# Patient Record
Sex: FEMALE | Race: WHITE | URBAN - METROPOLITAN AREA
[De-identification: names, ages, dates, MRNs, and addresses within clinical notes are randomized per-mention and may not be internally consistent; named-entity substitution may affect disease eponyms.]

---

## 2017-10-01 ENCOUNTER — TRANSFERRED RECORDS (OUTPATIENT)
Dept: HEALTH INFORMATION MANAGEMENT | Facility: CLINIC | Age: 32
End: 2017-10-01

## 2017-10-01 LAB — PAP SMEAR - HIM PATIENT REPORTED: NEGATIVE

## 2018-03-10 ENCOUNTER — OFFICE VISIT (OUTPATIENT)
Dept: FAMILY MEDICINE | Facility: CLINIC | Age: 33
End: 2018-03-10
Payer: COMMERCIAL

## 2018-03-10 VITALS
HEART RATE: 78 BPM | OXYGEN SATURATION: 97 % | HEIGHT: 67 IN | WEIGHT: 154 LBS | TEMPERATURE: 98.6 F | RESPIRATION RATE: 14 BRPM | DIASTOLIC BLOOD PRESSURE: 68 MMHG | BODY MASS INDEX: 24.17 KG/M2 | SYSTOLIC BLOOD PRESSURE: 110 MMHG

## 2018-03-10 DIAGNOSIS — K92.1 BLOOD IN STOOL: Primary | ICD-10-CM

## 2018-03-10 DIAGNOSIS — Z23 NEED FOR PROPHYLACTIC VACCINATION WITH TETANUS-DIPHTHERIA (TD): ICD-10-CM

## 2018-03-10 DIAGNOSIS — Z12.4 SCREENING FOR MALIGNANT NEOPLASM OF CERVIX: ICD-10-CM

## 2018-03-10 LAB — HGB BLD-MCNC: 12.8 G/DL (ref 11.7–15.7)

## 2018-03-10 PROCEDURE — 85018 HEMOGLOBIN: CPT | Performed by: FAMILY MEDICINE

## 2018-03-10 PROCEDURE — 99204 OFFICE O/P NEW MOD 45 MIN: CPT | Performed by: FAMILY MEDICINE

## 2018-03-10 PROCEDURE — 36415 COLL VENOUS BLD VENIPUNCTURE: CPT | Performed by: FAMILY MEDICINE

## 2018-03-10 RX ORDER — NORGESTIMATE AND ETHINYL ESTRADIOL 7DAYSX3 28
1 KIT ORAL DAILY
COMMUNITY

## 2018-03-10 NOTE — MR AVS SNAPSHOT
After Visit Summary   3/10/2018    Sushma Palmer    MRN: [id number]           Patient Information     Date Of Birth          1985        Visit Information        Provider Department      3/10/2018 11:00 AM Jaqueline Lee MD [facility]        Today's Diagnoses     Blood in stool    -  1    Screening for malignant neoplasm of cervix        Need for prophylactic vaccination with tetanus-diphtheria (TD)          Care Instructions      -We need to figure out what's going on with this for you!  -It could be an infection - (E Coli, Salmonella, or others) so we'll check for those today.  -It could hemorrhoids but I don't think so.  Usually this is a small amount of bleeding and only on toilet paper, with a painful bump at your anus.  -I'm concerned that this could be ulcerative colitis or crohn's disease.  You need to have a colonoscopy to rule this out - please call to schedule ASAP (I'd like this to be done this week, you will likely need to take a day off of work for this).  -We're going to check a hgb today; if this is seriously low you'll have to go the ER.        When You Have Gastrointestinal (GI) Bleeding    Blood in your vomit or stool can be a sign of gastrointestinal (GI) bleeding. GI bleeding can be scary. But the cause may not be serious. You should always see a doctor if GI bleeding occurs.  The GI tract  The GI tract is the path through which food travels in the body. Food passes from the mouth down the esophagus (the tube from the mouth to the stomach). Food begins to break down in the stomach. It then moves through the duodenum, the first part of the small intestine. Nutrients are absorbed as food travels through the small intestine. What is left passes into the colon (large intestine) as waste. The colon removes water from the waste. Waste continues from the colon to the rectum (where stool is stored). Waste then leaves the body through the  anus.  Causes of GI bleeding  GI bleeding can be caused by many different problems. Some of the more common causes include:    Swollen veins in the anus (hemorrhoids)    Swollen veins in the esophagus (varices)    Sore on the lining of the GI tract (ulcer)    Cuts or scrapes in the mouth or throat    Infection caused by germs such as bacteria or parasites    Food allergies, such as milk allergy in young children    Medicines    Inflammation of the GI tract (gastritis or esophagitis)    Colitis (Crohn's disease or ulcerative colitis)    Cancer (tumors or polyps)    Abnormal pouches in the colon (diverticula)    Tears in the esophagus or anus    Nosebleed    Abnormal blood vessels in the GI tract (angiodysplasia)  Diagnosing the cause of blood in stool  If blood is coming out in your stool, you may have a lower GI tract problem or a very fast upper GI tract bleed. Bleeding from the GI tract can be bright red. Or it may look dark and tarry. Tests may also find blood in your stool that can t be seen with the eye (occult blood). To find out the cause, tests that may be ordered include:    Blood tests. A blood sample is taken and sent to a lab for exam.    Hemoccult test. Checks a stool sample for blood.    Stool culture. Checks a stool sample for bacteria or parasites.    X-ray, ultrasound, or CT scan. Imaging tests that take pictures of the digestive tract.    Colonoscopy or sigmoidoscopy. This test uses a flexible tube with a tiny camera. The tube is inserted through your anus into your rectum to see the inside of your colon. Your provider can also take a tiny tissue sample (biopsy) and treat a bleeding source  Diagnosing the cause of blood in vomit  If you are vomiting blood or something that looks like coffee grounds, you may have an upper GI tract problem. To find the cause, tests that may be done include:    Upper Endoscopy. A flexible tube with a tiny camera is inserted through your mouth and throat to see inside  your upper GI tract. This lets your provider take a tiny tissue sample (biopsy) and treat a bleeding source.    Nasogastric lavage. This can tell if you have upper GI or lower GI bleeding.    X-ray, ultrasound, or CT scan. Imaging tests that take pictures of your digestive tract.    Upper GI series. X-rays of the upper part of your GI tract taken from inside your body.    Enteroscopy. This sends a flexible tube or a small, swallowed capsule camera into your small intestine.  When to call your healthcare provider  Call your healthcare provider right away if you have any of the following:    Bleeding from your mouth or anus that can't be stopped    Fever of 100.4 F (38.0 ) or higher    Bleeding along with feeling lightheaded or dizzy    Signs of fluid loss (dehydration). These include a dry, sticky mouth, decreased urine output; and very dark urine.    Belly (abdominal) pain   Date Last Reviewed: 7/1/2016 2000-2017 [employer]. [address] [place] [address]. All rights reserved. This information is not intended as a substitute for professional medical care. Always follow your healthcare professional's instructions.                Follow-ups after your visit        Additional Services     GASTROENTEROLOGY ADULT REF PROCEDURE ONLY [facility] [phone] (Dr. Blackburn - or whoever she can get in with first ASAP)       Last Lab Result: No results found for: CR  Body mass index is 24.12 kg/(m^2).     Needed:  No  Language:  English    Patient will be contacted to schedule procedure.     Please be aware that coverage of these services is subject to the terms and limitations of your health insurance plan.  Call member services at your health plan with any benefit or coverage questions.  Any procedures must be performed at a [facility] facility OR coordinated by your clinic's referral office.    Please bring the following with you to your appointment:    (1) Any X-Rays, CTs or MRIs  "which have been performed.  Contact the facility where they were done to arrange for  prior to your scheduled appointment.    (2) List of current medications   (3) This referral request   (4) Any documents/labs given to you for this referral                  Follow-up notes from your care team     Return in about 4 weeks (around 4/7/2018) for Follow up of results.      Future tests that were ordered for you today     Open Future Orders        Priority Expected Expires Ordered    Enteric Bacteria and Virus Panel by JAGRUTI Stool Routine  3/10/2019 3/10/2018            Who to contact     If you have questions or need follow up information about today's clinic visit or your schedule please contact [facility] directly at [phone].  Normal or non-critical lab and imaging results will be communicated to you by [employer] Directhart, letter or phone within 4 business days after the clinic has received the results. If you do not hear from us within 7 days, please contact the clinic through [employer] Direct[employer] or phone. If you have a critical or abnormal lab result, we will notify you by phone as soon as possible.  Submit refill requests through [employer] or call your pharmacy and they will forward the refill request to us. Please allow 3 business days for your refill to be completed.          Additional Information About Your Visit        Visitar Information     [employer] lets you send messages to your doctor, view your test results, renew your prescriptions, schedule appointments and more. To sign up, go to [email or web address] . Click on \"Log in\" on the left side of the screen, which will take you to the Welcome page. Then click on \"Sign up Now\" on the right side of the page.     You will be asked to enter the access code listed below, as well as some personal information. Please follow the directions to create your username and password.     Your access code is: [email or web address]  Expires: 6/8/2018 11:48 AM     Your " "access code will  in 90 days. If you need help or a new code, please call your [facility] clinic or [phone].        Care EveryWhere ID     This is your Care EveryWhere ID. This could be used by other organizations to access your [facility] medical records  [id number]        Your Vitals Were     Pulse Temperature Respirations Height Last Period Pulse Oximetry    78 98.6  F (37  C) (Tympanic) 14 5' 7\" (1.702 m) 2018 (Exact Date) 97%    Breastfeeding? BMI (Body Mass Index)                No 24.12 kg/m2           Blood Pressure from Last 3 Encounters:   03/10/18 110/68    Weight from Last 3 Encounters:   03/10/18 154 lb (69.9 kg)              We Performed the Following     GASTROENTEROLOGY ADULT REF PROCEDURE ONLY [facility] [phone] (Dr. Blackburn - or whoever she can get in with first ASAP)     Hemoglobin        Primary Care Provider Office Phone # Fax #    [facility] [phone]       [address] [facility] [phone]        Equal Access to Services     MARGY OLGUIN : Hadii aad ku hadasho Soomaali, waaxda luqadaha, qaybta kaalmada adeegyada, waxay idiin hayaan adeeg eulaarahannah hamm . So [facility] [phone].    ATENCIÓN: Si habla español, tiene a akins disposición servicios gratuitos de asistencia lingüística. Llame al [phone].    We comply with applicable federal civil rights laws and [place] laws. We do not discriminate on the basis of race, color, national origin, age, disability, sex, sexual orientation, or gender identity.            Thank you!     Thank you for choosing [facility]  for your care. Our goal is always to provide you with excellent care. Hearing back from our patients is one way we can continue to improve our services. Please take a few minutes to complete the written survey that you may receive in the mail after your visit with us. Thank you!             Your Updated Medication List - " Protect others around you: Learn how to safely use, store and throw away your medicines at www.disposemymeds.org.          This list is accurate as of 3/10/18 11:48 AM.  Always use your most recent med list.                   Brand Name Dispense Instructions for use Diagnosis    ADDERALL PO           FLUOXETINE HCL PO      Take 20 mg by mouth daily        TRI-ESTARYLLA 0.18/0.215/0.25 MG-35 MCG per tablet   Generic drug:  norgestim-eth estrad triphasic      Take 1 tablet by mouth daily

## 2018-03-10 NOTE — PROGRESS NOTES
SUBJECTIVE:   Sushma Palmer is a 32 year old female who presents to clinic today for the following health issues:      ABDOMINAL PAIN     Onset: thursday    Description:   Character: Sharp, cramping  Location: pelvic region  Radiation: None    Intensity: moderate    Progression of Symptoms:  worsening and same    Accompanying Signs & Symptoms:  Fever/Chills?: no   Gas/Bloating: no   Nausea: YES  Vomitting: YES- yesterday, once, bile mostly  Diarrhea?: YES  Constipation:recently had this issue while on vacation, did not go for 5-6 days  Dysuria or Hematuria: YES- blood in toilet bowl, and when wiping   History:   Trauma: no   Previous similar pain: no    Previous tests done: none    Precipitating factors:   Does the pain change with:     Food: YES- hard to eat     BM: no     Urination: no     Alleviating factors:  n/a    Therapies Tried and outcome: reducing alcohol, bland diet, increase intake    LMP:  2.5 weeks ago     32 year old new to me in clinic today.  Having blood in toilet bowel and cramping in stomach for 3 days - a lot of blood.  Whole toilet bowl is red and her lower stomach is tender. Started Thursday, Friday and Saturday.  Before that was slightly constipated.  No fever.  Normal appetite except when going to the bathroom.  Was just in [place].  Worse in the morning.      No family history of crohn's.  Does have family history of hypertension.    Had ulcers several years ago.  Had bleeding in stool just like this 7-8 years ago.  Was drinking more then - her doctor told her to decrease her anxiety and started her on anxiety medications and then eventually just went away.      Problem list and histories reviewed & adjusted, as indicated.  Additional history: as documented    Reviewed and updated as needed this visit by clinical staff  Tobacco  Allergies  Meds  Med Hx  Surg Hx  Fam Hx  Soc Hx      Reviewed and updated as needed this visit by Provider  Meds         ROS:  Constitutional, HEENT,  "cardiovascular, pulmonary, gi and gu systems are negative, except as otherwise noted.    OBJECTIVE:     /68  Pulse 78  Temp 98.6  F (37  C) (Tympanic)  Resp 14  Ht 5' 7\" (1.702 m)  Wt 154 lb (69.9 kg)  LMP 02/14/2018 (Exact Date)  SpO2 97%  Breastfeeding? No  BMI 24.12 kg/m2  Body mass index is 24.12 kg/(m^2).  GENERAL: healthy, alert and no distress  NECK: no adenopathy, no asymmetry, masses, or scars and thyroid normal to palpation  RESP: lungs clear to auscultation - no rales, rhonchi or wheezes  CV: regular rate and rhythm, normal S1 S2, no S3 or S4, no murmur, click or rub, no peripheral edema and peripheral pulses strong  ABDOMEN: soft, diffusely tender lower abdomen, no hepatosplenomegaly, no masses and bowel sounds normal  MS: no gross musculoskeletal defects noted, no edema    ASSESSMENT/PLAN:       Sushma was seen today for rectal problem.    Diagnoses and all orders for this visit:    Blood in stool  -     Enteric Bacteria and Virus Panel by JAGRUTI Stool; Future  -     Hemoglobin  -     GASTROENTEROLOGY ADULT REF PROCEDURE ONLY [facility] [phone] (Dr. Blackburn - or whoever she can get in with first ASAP)    Screening for malignant neoplasm of cervix    Need for prophylactic vaccination with tetanus-diphtheria (TD)    Other orders  -     Cancel: Pap imaged thin layer screen with HPV - recommended age 30 - 65 years (select HPV order below)        Patient Instructions       -We need to figure out what's going on with this for you!  -It could be an infection - (E Coli, Salmonella, or others) so we'll check for those today.  -It could hemorrhoids but I don't think so.  Usually this is a small amount of bleeding and only on toilet paper, with a painful bump at your anus.  -I'm concerned that this could be ulcerative colitis or crohn's disease.  You need to have a colonoscopy to rule this out - please call to schedule ASAP (I'd like this to be done this week, you will likely need to take a " day off of work for this).  -We're going to check a hgb today; if this is seriously low you'll have to go the ER.        When You Have Gastrointestinal (GI) Bleeding    Blood in your vomit or stool can be a sign of gastrointestinal (GI) bleeding. GI bleeding can be scary. But the cause may not be serious. You should always see a doctor if GI bleeding occurs.  The GI tract  The GI tract is the path through which food travels in the body. Food passes from the mouth down the esophagus (the tube from the mouth to the stomach). Food begins to break down in the stomach. It then moves through the duodenum, the first part of the small intestine. Nutrients are absorbed as food travels through the small intestine. What is left passes into the colon (large intestine) as waste. The colon removes water from the waste. Waste continues from the colon to the rectum (where stool is stored). Waste then leaves the body through the anus.  Causes of GI bleeding  GI bleeding can be caused by many different problems. Some of the more common causes include:    Swollen veins in the anus (hemorrhoids)    Swollen veins in the esophagus (varices)    Sore on the lining of the GI tract (ulcer)    Cuts or scrapes in the mouth or throat    Infection caused by germs such as bacteria or parasites    Food allergies, such as milk allergy in young children    Medicines    Inflammation of the GI tract (gastritis or esophagitis)    Colitis (Crohn's disease or ulcerative colitis)    Cancer (tumors or polyps)    Abnormal pouches in the colon (diverticula)    Tears in the esophagus or anus    Nosebleed    Abnormal blood vessels in the GI tract (angiodysplasia)  Diagnosing the cause of blood in stool  If blood is coming out in your stool, you may have a lower GI tract problem or a very fast upper GI tract bleed. Bleeding from the GI tract can be bright red. Or it may look dark and tarry. Tests may also find blood in your stool that can t be seen with the eye  (occult blood). To find out the cause, tests that may be ordered include:    Blood tests. A blood sample is taken and sent to a lab for exam.    Hemoccult test. Checks a stool sample for blood.    Stool culture. Checks a stool sample for bacteria or parasites.    X-ray, ultrasound, or CT scan. Imaging tests that take pictures of the digestive tract.    Colonoscopy or sigmoidoscopy. This test uses a flexible tube with a tiny camera. The tube is inserted through your anus into your rectum to see the inside of your colon. Your provider can also take a tiny tissue sample (biopsy) and treat a bleeding source  Diagnosing the cause of blood in vomit  If you are vomiting blood or something that looks like coffee grounds, you may have an upper GI tract problem. To find the cause, tests that may be done include:    Upper Endoscopy. A flexible tube with a tiny camera is inserted through your mouth and throat to see inside your upper GI tract. This lets your provider take a tiny tissue sample (biopsy) and treat a bleeding source.    Nasogastric lavage. This can tell if you have upper GI or lower GI bleeding.    X-ray, ultrasound, or CT scan. Imaging tests that take pictures of your digestive tract.    Upper GI series. X-rays of the upper part of your GI tract taken from inside your body.    Enteroscopy. This sends a flexible tube or a small, swallowed capsule camera into your small intestine.  When to call your healthcare provider  Call your healthcare provider right away if you have any of the following:    Bleeding from your mouth or anus that can't be stopped    Fever of 100.4 F (38.0 ) or higher    Bleeding along with feeling lightheaded or dizzy    Signs of fluid loss (dehydration). These include a dry, sticky mouth, decreased urine output; and very dark urine.    Belly (abdominal) pain   Date Last Reviewed: 7/1/2016 2000-2017 [employer]. [address] [place] [address], [place] [address]. All rights reserved.  This information is not intended as a substitute for professional medical care. Always follow your healthcare professional's instructions.            Jaqueline Lee MD  [facility]

## 2018-03-10 NOTE — NURSING NOTE
"Chief Complaint   Patient presents with     Rectal Problem       Initial /68  Pulse 78  Temp 98.6  F (37  C) (Tympanic)  Resp 14  Ht 5' 7\" (1.702 m)  Wt 154 lb (69.9 kg)  LMP 02/14/2018 (Exact Date)  SpO2 97%  Breastfeeding? No  BMI 24.12 kg/m2 Estimated body mass index is 24.12 kg/(m^2) as calculated from the following:    Height as of this encounter: 5' 7\" (1.702 m).    Weight as of this encounter: 154 lb (69.9 kg).  Medication Reconciliation: complete    "

## 2018-03-10 NOTE — LETTER
March 10, 2018      Sushma Palmer  [address]   [place] [address]        To Whom It May Concern,     Sushma Palmer attended clinic here on Mar 10, 2018 and will need to be excused from work 3/10/2018-3/11/2018,  Returning to work 3/12/2018.    If you have questions or concerns, please call the clinic at the number listed above.    Sincerely,         Jaqueline Lee MD

## 2018-03-10 NOTE — PATIENT INSTRUCTIONS
-We need to figure out what's going on with this for you!  -It could be an infection - (E Coli, Salmonella, or others) so we'll check for those today.  -It could hemorrhoids but I don't think so.  Usually this is a small amount of bleeding and only on toilet paper, with a painful bump at your anus.  -I'm concerned that this could be ulcerative colitis or crohn's disease.  You need to have a colonoscopy to rule this out - please call to schedule ASAP (I'd like this to be done this week, you will likely need to take a day off of work for this).  -We're going to check a hgb today; if this is seriously low you'll have to go the ER.        When You Have Gastrointestinal (GI) Bleeding    Blood in your vomit or stool can be a sign of gastrointestinal (GI) bleeding. GI bleeding can be scary. But the cause may not be serious. You should always see a doctor if GI bleeding occurs.  The GI tract  The GI tract is the path through which food travels in the body. Food passes from the mouth down the esophagus (the tube from the mouth to the stomach). Food begins to break down in the stomach. It then moves through the duodenum, the first part of the small intestine. Nutrients are absorbed as food travels through the small intestine. What is left passes into the colon (large intestine) as waste. The colon removes water from the waste. Waste continues from the colon to the rectum (where stool is stored). Waste then leaves the body through the anus.  Causes of GI bleeding  GI bleeding can be caused by many different problems. Some of the more common causes include:    Swollen veins in the anus (hemorrhoids)    Swollen veins in the esophagus (varices)    Sore on the lining of the GI tract (ulcer)    Cuts or scrapes in the mouth or throat    Infection caused by germs such as bacteria or parasites    Food allergies, such as milk allergy in young children    Medicines    Inflammation of the GI tract (gastritis or esophagitis)    Colitis  (Crohn's disease or ulcerative colitis)    Cancer (tumors or polyps)    Abnormal pouches in the colon (diverticula)    Tears in the esophagus or anus    Nosebleed    Abnormal blood vessels in the GI tract (angiodysplasia)  Diagnosing the cause of blood in stool  If blood is coming out in your stool, you may have a lower GI tract problem or a very fast upper GI tract bleed. Bleeding from the GI tract can be bright red. Or it may look dark and tarry. Tests may also find blood in your stool that can t be seen with the eye (occult blood). To find out the cause, tests that may be ordered include:    Blood tests. A blood sample is taken and sent to a lab for exam.    Hemoccult test. Checks a stool sample for blood.    Stool culture. Checks a stool sample for bacteria or parasites.    X-ray, ultrasound, or CT scan. Imaging tests that take pictures of the digestive tract.    Colonoscopy or sigmoidoscopy. This test uses a flexible tube with a tiny camera. The tube is inserted through your anus into your rectum to see the inside of your colon. Your provider can also take a tiny tissue sample (biopsy) and treat a bleeding source  Diagnosing the cause of blood in vomit  If you are vomiting blood or something that looks like coffee grounds, you may have an upper GI tract problem. To find the cause, tests that may be done include:    Upper Endoscopy. A flexible tube with a tiny camera is inserted through your mouth and throat to see inside your upper GI tract. This lets your provider take a tiny tissue sample (biopsy) and treat a bleeding source.    Nasogastric lavage. This can tell if you have upper GI or lower GI bleeding.    X-ray, ultrasound, or CT scan. Imaging tests that take pictures of your digestive tract.    Upper GI series. X-rays of the upper part of your GI tract taken from inside your body.    Enteroscopy. This sends a flexible tube or a small, swallowed capsule camera into your small intestine.  When to call your  healthcare provider  Call your healthcare provider right away if you have any of the following:    Bleeding from your mouth or anus that can't be stopped    Fever of 100.4 F (38.0 ) or higher    Bleeding along with feeling lightheaded or dizzy    Signs of fluid loss (dehydration). These include a dry, sticky mouth, decreased urine output; and very dark urine.    Belly (abdominal) pain   Date Last Reviewed: 7/1/2016 2000-2017 [employer]. [address] [place] [address]. All rights reserved. This information is not intended as a substitute for professional medical care. Always follow your healthcare professional's instructions.

## 2018-03-12 ENCOUNTER — TELEPHONE (OUTPATIENT)
Dept: GASTROENTEROLOGY | Facility: CLINIC | Age: 33
End: 2018-03-12

## 2018-03-13 DIAGNOSIS — K92.1 BLOOD IN STOOL: ICD-10-CM

## 2018-03-13 PROCEDURE — 87506 IADNA-DNA/RNA PROBE TQ 6-11: CPT | Performed by: FAMILY MEDICINE

## 2018-03-13 NOTE — LETTER
March 15, 2018      Sushma Palmer  [address]   [place] [address]        Dear Sushma,     It was nice to meet you recently!  I wanted to let you know your recent test results - details of the results can be found below. Briefly:     1. Your results show no sign of an infection.  I really think your next step is to get that colonoscopy scheduled.     Let me know if you have any questions about this, or other concerns.     Best,   Jaqueline Lee MD   [facility]   [phone]     Resulted Orders   Enteric Bacteria and Virus Panel by JAGRUTI Stool   Result Value Ref Range    Campylobacter group by JAGRUTI Not Detected NDET^Not Detected    Salmonella species by JAGRUTI Not Detected NDET^Not Detected    Shigella species by JAGRUTI Not Detected NDET^Not Detected    Vibrio group by JAGRUTI Not Detected NDET^Not Detected    Rotavirus A by JAGRUTI Not Detected NDET^Not Detected    Shiga toxin 1 gene by JAGRUTI Not Detected NDET^Not Detected    Shiga toxin 2 gene by JAGRUTI Not Detected NDET^Not Detected    Norovirus I and II by JAGRUTI Not Detected NDET^Not Detected    Yersinia enterocolitica by JAGRUTI Not Detected NDET^Not Detected    Enteric pathogen comment       Testing performed by multiplexed, qualitative PCR using the [employer] Enteric   Pathogens Nucleic Acid Test. Results should not be used as the sole basis for diagnosis,   treatment, or other patient management decisions.        Comment:      Positive results do not rule out co-infection with other organisms that are   not detected by this test, and may not be the sole or definitive cause of   patient illness.   Negative results in the setting of clinical illness compatible with   gastroenteritis may be due to infection by pathogens that are not detected by   this test or non-infectious causes such as ulcerative colitis, irritable bowel   syndrome, or Crohn's disease.   Note: Shiga toxin producing E. coli (STEC) typically harbor  one or both genes   that encode for Shiga toxins 1 and 2.

## 2018-03-14 ENCOUNTER — TELEPHONE (OUTPATIENT)
Dept: GASTROENTEROLOGY | Facility: CLINIC | Age: 33
End: 2018-03-14

## 2018-03-15 NOTE — PROGRESS NOTES
Hi Team 3:  Please sent a lab result with the following note.  Thank you!    Dear Sushma,    It was nice to meet you recently!  I wanted to let you know your recent test results - details of the results can be found below. Briefly:    1. Your results show no sign of an infection.  I really think your next step is to get that colonoscopy scheduled.    Let me know if you have any questions about this, or other concerns.    Best,  Jaqueline Lee MD  [facility]  [phone]         Results for orders placed or performed in visit on 03/13/18  -Enteric Bacteria and Virus Panel by JAGRUTI Stool       Result                                            Value                         Ref Range                       Campylobacter group by JAGRUTI                        Not Detected                  NDET^Not Detected               Salmonella species by JAGRUTI                         Not Detected                  NDET^Not Detected               Shigella species by JAGRUTI                           Not Detected                  NDET^Not Detected               Vibrio group by JAGRUTI                               Not Detected                  NDET^Not Detected               Rotavirus A by JAGRUTI                                Not Detected                  NDET^Not Detected               Shiga toxin 1 gene by JAGRUTI                         Not Detected                  NDET^Not Detected               Shiga toxin 2 gene by JAGRUTI                         Not Detected                  NDET^Not Detected               Norovirus I and II by JAGRUTI                         Not Detected                  NDET^Not Detected               Yersinia enterocolitica by JAGRUTI                    Not Detected                  NDET^Not Detected               Enteric pathogen comment                                                                                    Testing performed by multiplexed, qualitative PCR using the  [employer]igene Enteric    Pathogens Nucleic Acid Test. Results should not be used as the sole basis for diagnosis,    treatment, or other patient management decisions.

## 2018-03-20 ENCOUNTER — TELEPHONE (OUTPATIENT)
Dept: GASTROENTEROLOGY | Facility: CLINIC | Age: 33
End: 2018-03-20

## 2018-03-20 DIAGNOSIS — Z12.11 ENCOUNTER FOR SCREENING COLONOSCOPY: Primary | ICD-10-CM

## 2018-03-20 NOTE — TELEPHONE ENCOUNTER
Patient scheduled for colonoscopy    Indication for procedure. Blood in stool    Referring Provider. Jaqueline Lee MD    ? No    Arrival time verified? Patient instructed to arrive at 0745; verbalized understanding    Facility location verified? Yes    Instructions given regarding prep and procedure. Transport policy discussed.     Prep Type Golytely    Are you taking any anticoagulants or blood thinners? Denies    Instructions given? Yes, prep and transportation verified. Diet verified.     Electronic implanted devices? Denies    Pre procedure teaching completed? Yes    Transportation from procedure? Boyfriend will  and stay with her.     H&P / Pre op physical completed? N/A

## 2018-03-21 ENCOUNTER — HOSPITAL ENCOUNTER (OUTPATIENT)
Facility: AMBULATORY SURGERY CENTER | Age: 33
End: 2018-03-21
Attending: INTERNAL MEDICINE
Payer: COMMERCIAL

## 2018-03-21 ENCOUNTER — SURGERY (OUTPATIENT)
Age: 33
End: 2018-03-21

## 2018-03-21 VITALS
DIASTOLIC BLOOD PRESSURE: 76 MMHG | RESPIRATION RATE: 16 BRPM | TEMPERATURE: 98 F | OXYGEN SATURATION: 96 % | SYSTOLIC BLOOD PRESSURE: 121 MMHG

## 2018-03-21 LAB — COLONOSCOPY: NORMAL

## 2018-03-21 RX ORDER — FLUMAZENIL 0.1 MG/ML
0.2 INJECTION, SOLUTION INTRAVENOUS
Status: ACTIVE | OUTPATIENT
Start: 2018-03-21 | End: 2018-03-21

## 2018-03-21 RX ORDER — ONDANSETRON 2 MG/ML
4 INJECTION INTRAMUSCULAR; INTRAVENOUS
Status: DISCONTINUED | OUTPATIENT
Start: 2018-03-21 | End: 2018-03-21 | Stop reason: HOSPADM

## 2018-03-21 RX ORDER — LIDOCAINE 40 MG/G
CREAM TOPICAL
Status: DISCONTINUED | OUTPATIENT
Start: 2018-03-21 | End: 2018-03-21 | Stop reason: HOSPADM

## 2018-03-21 RX ORDER — NALOXONE HYDROCHLORIDE 0.4 MG/ML
.1-.4 INJECTION, SOLUTION INTRAMUSCULAR; INTRAVENOUS; SUBCUTANEOUS
Status: DISCONTINUED | OUTPATIENT
Start: 2018-03-21 | End: 2018-03-22 | Stop reason: HOSPADM

## 2018-03-21 RX ORDER — FENTANYL CITRATE 50 UG/ML
INJECTION, SOLUTION INTRAMUSCULAR; INTRAVENOUS PRN
Status: DISCONTINUED | OUTPATIENT
Start: 2018-03-21 | End: 2018-03-21 | Stop reason: HOSPADM

## 2018-03-21 RX ADMIN — FENTANYL CITRATE 100 MCG: 50 INJECTION, SOLUTION INTRAMUSCULAR; INTRAVENOUS at 09:21

## 2018-03-21 NOTE — LETTER
Patient:  Sushma Palmer  :   1985  MRN:     [id number]        Ms.Ashley TRAM Palmer  [address]   [facility] [address]        2018    Dear ,    We are writing to inform you of your test results.    The pathology of the polyp returned as a mucosal prolapse type polyp.  I had the pathology re-reviewed to make sure there was nothing concerning about the polyp, but in the end there was no cancer or precancer.  This type of polyp has no risk of colon cancer and does not increase your future risk of pre-cancerous polyps.      Because your polyp was a prolapse type polyp, and not a pre-cancerous polyp, your next screening colonoscopy should be when you turn 51.  If a first degree family member developed pre-cancerous polyps or colon cancer, or if you develop new symptoms (rectal bleeding, change in bowel pattern, etc), then you may need an earlier colonoscopy. You can contact myself or your primary care doctor if you have questions.     As discussed at the time of your procedure, I am not sure if the bleeding was from the polyp or hemorrhoids.  Please try the supplemental fiber and Miralax as needed.  If you have any questions, please don't hesitate to contact the Gastroenterology nurse at [phone].     Mariano Meier MD    [facility]  Inflammatory Bowel Disease Program  Division of Gastroenterology, Hepatology and Nutrition        Resulted Orders   Surgical pathology exam   Result Value Ref Range    Copath Report       Patient Name: SUSHMA PALMER  MR#: [id number]  Specimen #: [id number]  Collected: 3/21/2018  Received: 3/21/2018  Reported: 3/22/2018 11:28  Ordering Phy(s): MARIANO MEIER    For improved result formatting, select 'View Enhanced Report Format' under   Linked Documents section.    SPECIMEN(S):  Colon polyp at 50 cm    FINAL DIAGNOSIS:  COLON, AT 50 CM, POLYP, POLYPECTOMY:  - Mucosal prolapse type polyp, see microscopic description  -  "Negative for neoplastic polyp and malignancy    I have personally reviewed all specimens and/or slides, including the   listed special stains, and used them  with my medical judgement to determine or confirm the final diagnosis.    Electronically signed out by:    Carmelo Brush M.D., [facility]    CLINICAL HISTORY:  32 year old female with hematochezia.  GROSS:  The specimen is received in formalin with proper patient identification,   labeled \"large intestine, polyp at 50  cm\", and consists of a 1.7 x 0.9 x 0.9 cm dark red-tan, lobulated,   peduncu lated polyp with a 1.2 cm in length  x 0.3 cm in diameter stalk.  The margin is inked blue, and the specimen is   serially sectioned and entirely  submitted in cassettes A1-A2. (Dictated by: Nan Mack 3/21/2018 11:05   AM)    MICROSCOPIC:  This is a pedunculated benign polyp with crypt hyperplasia, muscularis   mucosa hyperplasia, congestion and  edema. There is no evidence of dysplasia or malignancy. The findings are   those of mucosal prolapse type polyp.  These polyps may be associated with diverticular disease.    CPT Codes:  A: 29946-TO4    TESTING LAB LOCATION:  [facility], [address]   [address] [phone]    COLLECTION SITE:  Client: [facility]  Location: [facility] (B)    Resident  [id number]           "

## 2018-03-21 NOTE — IP AVS SNAPSHOT
MRN:[id number]                      After Visit Summary   3/21/2018    Sushma Palmer    MRN: [id number]           Thank you!     Thank you for choosing [facility] for your care. Our goal is always to provide you with excellent care. Hearing back from our patients is one way we can continue to improve our services. Please take a few minutes to complete the written survey that you may receive in the mail after you visit with us. Thank you!        Patient Information     Date Of Birth          1985        About your hospital stay     You were admitted on:  March 21, 2018 You last received care in the:  [facility]    You were discharged on:  March 21, 2018       Who to Call     For medical emergencies, please call 911.  For non-urgent questions about your medical care, please call your primary care provider or clinic, [phone]  For questions related to your surgery, please call your surgery clinic        Attending Provider     Provider Specialty    Rigoberto Blackburn MD Gastroenterology       Primary Care Provider Office Phone # Fax #    Hilda [facility] [phone]      After Care Instructions     Discharge Instructions       Resume pre procedure diet            Discharge Instructions       Restart home medications.                  Pending Results     Date and Time Order Name Status Description    3/21/2018 0940 Surgical pathology exam In process             Admission Information     Date & Time Provider Department Dept. Phone    3/21/2018 Rigoberto Blackburn MD [facility] [phone]      Your Vitals Were     Blood Pressure Temperature Respirations Last Period Pulse Oximetry       121/76 98  F (36.7  C) (Temporal) 16 03/14/2018 96%       [employer] Information     [employer] is an electronic gateway that provides easy, online access to your medical records. With [employer], you can request a clinic  "appointment, read your test results, renew a prescription or communicate with your care team.     To sign up for [employer] visit the website at [email or web address]   You will be asked to enter the access code listed below, as well as some personal information. Please follow the directions to create your username and password.     Your access code is: [email or web address]  Expires: 2018 12:48 PM     Your access code will  in 90 days. If you need help or a new code, please contact your [facility] or call [phone] for assistance.        Care EveryWhere ID     This is your Care EveryWhere ID. This could be used by other organizations to access your [facility] medical records  [id number]        Equal Access to Services     MARGY OLGUIN : Katia Awad, wamarcelina rojasqblaise, qabiju kaalmadyan bills, moriah hamm . So [facility] [phone].    ATENCIÓN: Si habla español, tiene a akins disposición servicios gratuitos de asistencia lingüística. Llame al [phone].    We comply with applicable federal civil rights laws and [place] laws. We do not discriminate on the basis of race, color, national origin, age, disability, sex, sexual orientation, or gender identity.               Review of your medicines      UNREVIEWED medicines. Ask your doctor about these medicines        Dose / Directions    ADDERALL PO        Refills:  0       FLUOXETINE HCL PO        Dose:  20 mg   Take 20 mg by mouth daily   Refills:  0       polyethylene glycol 236 G suspension   Commonly known as:  GoLYTELY/NuLYTELY   Used for:  Encounter for screening colonoscopy   Ask about: Should I take this medication?        Dose:  4 L   Take 4,000 mLs (4 L) by mouth once for 1 dose Refer to \"Getting Ready for a Colonoscopy\" instruction handout   Quantity:  4000 mL   Refills:  0       TRI-ESTARYLLA 0.18/0.215/0.25 MG-35 MCG per tablet   Generic drug:  norgestim-eth estrad triphasic " "       Dose:  1 tablet   Take 1 tablet by mouth daily   Refills:  0                Protect others around you: Learn how to safely use, store and throw away your medicines at www.disposemymeds.org.             Medication List: This is a list of all your medications and when to take them. Check marks below indicate your daily home schedule. Keep this list as a reference.      Medications           Morning Afternoon Evening Bedtime As Needed    ADDERALL PO                                FLUOXETINE HCL PO   Take 20 mg by mouth daily                                TRI-ESTARYLLA 0.18/0.215/0.25 MG-35 MCG per tablet   Take 1 tablet by mouth daily   Generic drug:  norgestim-eth estrad triphasic                                  ASK your doctor about these medications           Morning Afternoon Evening Bedtime As Needed    polyethylene glycol 236 G suspension   Commonly known as:  GoLYTELY/NuLYTELY   Take 4,000 mLs (4 L) by mouth once for 1 dose Refer to \"Getting Ready for a Colonoscopy\" instruction handout   Ask about: Should I take this medication?                                  "

## 2018-03-21 NOTE — IP AVS SNAPSHOT
[facility]    [address] [place] [address]    Phone:  [phone]    Fax:  [phone]                                       After Visit Summary   3/21/2018    Sushma Palmer    MRN: [id number]           After Visit Summary Signature Page     I have received my discharge instructions, and my questions have been answered. I have discussed any challenges I see with this plan with the nurse or doctor.    ..........................................................................................................................................  Patient/Patient Representative Signature      ..........................................................................................................................................  Patient Representative Print Name and Relationship to Patient    ..................................................               ................................................  Date                                            Time    ..........................................................................................................................................  Reviewed by Signature/Title    ...................................................              ..............................................  Date                                                            Time

## 2018-03-22 LAB — COPATH REPORT: NORMAL

## (undated) DEVICE — SOL WATER IRRIG 1000ML BOTTLE 2F7114

## (undated) DEVICE — SUCTION MANIFOLD NEPTUNE 2 SYS 4 PORT 0702-020-000

## (undated) DEVICE — SPECIMEN CONTAINER 3OZ W/FORMALIN 59901

## (undated) DEVICE — ENDO FORCEP ENDOJAW BIOPSY 2.8MMX230CM FB-220U

## (undated) DEVICE — ENDO CONNECTOR ENDOGATOR AUX WATER JET FOR OLYMPUS SCOPE

## (undated) DEVICE — TAPE DURAPORE 3" SILK 1538-3

## (undated) DEVICE — WIPE PREMOIST CLEANSING WASHCLOTHS 7988

## (undated) DEVICE — ENDO CAP AND TUBING STERILE FOR ENDOGATOR  100130

## (undated) RX ORDER — FENTANYL CITRATE 50 UG/ML
INJECTION, SOLUTION INTRAMUSCULAR; INTRAVENOUS
Status: DISPENSED
Start: 2018-03-21